# Patient Record
Sex: FEMALE | Race: WHITE | NOT HISPANIC OR LATINO | Employment: OTHER | URBAN - NONMETROPOLITAN AREA
[De-identification: names, ages, dates, MRNs, and addresses within clinical notes are randomized per-mention and may not be internally consistent; named-entity substitution may affect disease eponyms.]

---

## 2019-10-22 ENCOUNTER — OFFICE VISIT (OUTPATIENT)
Dept: FAMILY MEDICINE CLINIC | Facility: CLINIC | Age: 64
End: 2019-10-22

## 2019-10-22 ENCOUNTER — LAB (OUTPATIENT)
Dept: LAB | Facility: OTHER | Age: 64
End: 2019-10-22

## 2019-10-22 VITALS
HEART RATE: 71 BPM | BODY MASS INDEX: 26.63 KG/M2 | HEIGHT: 64 IN | WEIGHT: 156 LBS | SYSTOLIC BLOOD PRESSURE: 152 MMHG | DIASTOLIC BLOOD PRESSURE: 90 MMHG | OXYGEN SATURATION: 99 % | TEMPERATURE: 97.8 F

## 2019-10-22 DIAGNOSIS — Z78.0 POSTMENOPAUSAL: ICD-10-CM

## 2019-10-22 DIAGNOSIS — E66.3 OVERWEIGHT: ICD-10-CM

## 2019-10-22 DIAGNOSIS — Z23 NEED FOR PNEUMOCOCCAL VACCINATION: ICD-10-CM

## 2019-10-22 DIAGNOSIS — Z13.29 SCREENING FOR THYROID DISORDER: ICD-10-CM

## 2019-10-22 DIAGNOSIS — Z23 NEED FOR INFLUENZA VACCINATION: ICD-10-CM

## 2019-10-22 DIAGNOSIS — Z12.31 SCREENING MAMMOGRAM, ENCOUNTER FOR: ICD-10-CM

## 2019-10-22 DIAGNOSIS — I10 ESSENTIAL HYPERTENSION: ICD-10-CM

## 2019-10-22 DIAGNOSIS — I10 ESSENTIAL HYPERTENSION: Primary | ICD-10-CM

## 2019-10-22 DIAGNOSIS — Z13.820 SCREENING FOR OSTEOPOROSIS: ICD-10-CM

## 2019-10-22 DIAGNOSIS — M17.11 OSTEOARTHRITIS OF RIGHT KNEE, UNSPECIFIED OSTEOARTHRITIS TYPE: ICD-10-CM

## 2019-10-22 DIAGNOSIS — Z01.419 WELL WOMAN EXAM WITH ROUTINE GYNECOLOGICAL EXAM: ICD-10-CM

## 2019-10-22 LAB
ALBUMIN SERPL-MCNC: 4.8 G/DL (ref 3.5–5)
ALBUMIN/GLOB SERPL: 1.5 G/DL (ref 1.1–1.8)
ALP SERPL-CCNC: 65 U/L (ref 38–126)
ALT SERPL W P-5'-P-CCNC: 28 U/L
ANION GAP SERPL CALCULATED.3IONS-SCNC: 12 MMOL/L (ref 5–15)
AST SERPL-CCNC: 42 U/L (ref 14–36)
BASOPHILS # BLD AUTO: 0.02 10*3/MM3 (ref 0–0.2)
BASOPHILS NFR BLD AUTO: 0.3 % (ref 0–1.5)
BILIRUB SERPL-MCNC: 0.8 MG/DL (ref 0.2–1.3)
BUN BLD-MCNC: 17 MG/DL (ref 7–23)
BUN/CREAT SERPL: 25 (ref 7–25)
CALCIUM SPEC-SCNC: 10.2 MG/DL (ref 8.4–10.2)
CHLORIDE SERPL-SCNC: 105 MMOL/L (ref 101–112)
CHOLEST SERPL-MCNC: 235 MG/DL (ref 150–200)
CO2 SERPL-SCNC: 27 MMOL/L (ref 22–30)
CREAT BLD-MCNC: 0.68 MG/DL (ref 0.52–1.04)
DEPRECATED RDW RBC AUTO: 47.5 FL (ref 37–54)
EOSINOPHIL # BLD AUTO: 0.02 10*3/MM3 (ref 0–0.4)
EOSINOPHIL NFR BLD AUTO: 0.3 % (ref 0.3–6.2)
ERYTHROCYTE [DISTWIDTH] IN BLOOD BY AUTOMATED COUNT: 12.8 % (ref 12.3–15.4)
GFR SERPL CREATININE-BSD FRML MDRD: 87 ML/MIN/1.73 (ref 45–104)
GLOBULIN UR ELPH-MCNC: 3.2 GM/DL (ref 2.3–3.5)
GLUCOSE BLD-MCNC: 94 MG/DL (ref 70–99)
HCT VFR BLD AUTO: 38.5 % (ref 34–46.6)
HDLC SERPL-MCNC: 58 MG/DL (ref 40–59)
HGB BLD-MCNC: 12.6 G/DL (ref 12–15.9)
LDLC SERPL CALC-MCNC: 141 MG/DL
LDLC/HDLC SERPL: 2.43 {RATIO} (ref 0–3.22)
LYMPHOCYTES # BLD AUTO: 1.47 10*3/MM3 (ref 0.7–3.1)
LYMPHOCYTES NFR BLD AUTO: 25.1 % (ref 19.6–45.3)
MCH RBC QN AUTO: 33.1 PG (ref 26.6–33)
MCHC RBC AUTO-ENTMCNC: 32.7 G/DL (ref 31.5–35.7)
MCV RBC AUTO: 101 FL (ref 79–97)
MONOCYTES # BLD AUTO: 0.41 10*3/MM3 (ref 0.1–0.9)
MONOCYTES NFR BLD AUTO: 7 % (ref 5–12)
NEUTROPHILS # BLD AUTO: 3.93 10*3/MM3 (ref 1.7–7)
NEUTROPHILS NFR BLD AUTO: 67.3 % (ref 42.7–76)
PLATELET # BLD AUTO: 243 10*3/MM3 (ref 140–450)
PMV BLD AUTO: 9.3 FL (ref 6–12)
POTASSIUM BLD-SCNC: 3.7 MMOL/L (ref 3.4–5)
PROT SERPL-MCNC: 8 G/DL (ref 6.3–8.6)
RBC # BLD AUTO: 3.81 10*6/MM3 (ref 3.77–5.28)
SODIUM BLD-SCNC: 144 MMOL/L (ref 137–145)
TRIGL SERPL-MCNC: 179 MG/DL
VLDLC SERPL-MCNC: 35.8 MG/DL
WBC NRBC COR # BLD: 5.85 10*3/MM3 (ref 3.4–10.8)

## 2019-10-22 PROCEDURE — 99204 OFFICE O/P NEW MOD 45 MIN: CPT | Performed by: PHYSICIAN ASSISTANT

## 2019-10-22 PROCEDURE — 85025 COMPLETE CBC W/AUTO DIFF WBC: CPT | Performed by: PHYSICIAN ASSISTANT

## 2019-10-22 PROCEDURE — 84439 ASSAY OF FREE THYROXINE: CPT | Performed by: PHYSICIAN ASSISTANT

## 2019-10-22 PROCEDURE — 90653 IIV ADJUVANT VACCINE IM: CPT | Performed by: PHYSICIAN ASSISTANT

## 2019-10-22 PROCEDURE — 80061 LIPID PANEL: CPT | Performed by: PHYSICIAN ASSISTANT

## 2019-10-22 PROCEDURE — G0009 ADMIN PNEUMOCOCCAL VACCINE: HCPCS | Performed by: PHYSICIAN ASSISTANT

## 2019-10-22 PROCEDURE — 80053 COMPREHEN METABOLIC PANEL: CPT | Performed by: PHYSICIAN ASSISTANT

## 2019-10-22 PROCEDURE — 90670 PCV13 VACCINE IM: CPT | Performed by: PHYSICIAN ASSISTANT

## 2019-10-22 PROCEDURE — 36415 COLL VENOUS BLD VENIPUNCTURE: CPT | Performed by: PHYSICIAN ASSISTANT

## 2019-10-22 PROCEDURE — G0008 ADMIN INFLUENZA VIRUS VAC: HCPCS | Performed by: PHYSICIAN ASSISTANT

## 2019-10-22 PROCEDURE — 84443 ASSAY THYROID STIM HORMONE: CPT | Performed by: PHYSICIAN ASSISTANT

## 2019-10-22 RX ORDER — SENNOSIDES 8.6 MG
650 CAPSULE ORAL
COMMUNITY

## 2019-10-22 RX ORDER — MELOXICAM 7.5 MG/1
TABLET ORAL
Refills: 6 | COMMUNITY
Start: 2019-10-12 | End: 2021-05-04 | Stop reason: SDUPTHER

## 2019-10-22 RX ORDER — LISINOPRIL AND HYDROCHLOROTHIAZIDE 12.5; 1 MG/1; MG/1
1 TABLET ORAL DAILY
Qty: 30 TABLET | Refills: 5 | Status: SHIPPED | OUTPATIENT
Start: 2019-10-22 | End: 2020-04-16 | Stop reason: SDUPTHER

## 2019-10-23 LAB
T4 FREE SERPL-MCNC: 1.2 NG/DL (ref 0.93–1.7)
TSH SERPL DL<=0.05 MIU/L-ACNC: 2.05 UIU/ML (ref 0.27–4.2)

## 2019-10-25 DIAGNOSIS — E78.2 MIXED HYPERLIPIDEMIA: Primary | ICD-10-CM

## 2019-10-25 DIAGNOSIS — R71.8 ELEVATED MCV: ICD-10-CM

## 2019-10-25 RX ORDER — ATORVASTATIN CALCIUM 20 MG/1
20 TABLET, FILM COATED ORAL DAILY
Qty: 30 TABLET | Refills: 5 | Status: SHIPPED | OUTPATIENT
Start: 2019-10-25 | End: 2020-03-30

## 2019-10-28 ENCOUNTER — TELEPHONE (OUTPATIENT)
Dept: FAMILY MEDICINE CLINIC | Facility: CLINIC | Age: 64
End: 2019-10-28

## 2019-11-08 ENCOUNTER — LAB (OUTPATIENT)
Dept: LAB | Facility: OTHER | Age: 64
End: 2019-11-08

## 2019-11-08 DIAGNOSIS — R71.8 ELEVATED MCV: ICD-10-CM

## 2019-11-08 LAB
BASOPHILS # BLD AUTO: 0.02 10*3/MM3 (ref 0–0.2)
BASOPHILS NFR BLD AUTO: 0.3 % (ref 0–1.5)
DEPRECATED RDW RBC AUTO: 43.2 FL (ref 37–54)
EOSINOPHIL # BLD AUTO: 0.04 10*3/MM3 (ref 0–0.4)
EOSINOPHIL NFR BLD AUTO: 0.7 % (ref 0.3–6.2)
ERYTHROCYTE [DISTWIDTH] IN BLOOD BY AUTOMATED COUNT: 12 % (ref 12.3–15.4)
HCT VFR BLD AUTO: 38.9 % (ref 34–46.6)
HGB BLD-MCNC: 12.3 G/DL (ref 12–15.9)
LYMPHOCYTES # BLD AUTO: 1.45 10*3/MM3 (ref 0.7–3.1)
LYMPHOCYTES NFR BLD AUTO: 23.8 % (ref 19.6–45.3)
MCH RBC QN AUTO: 31.7 PG (ref 26.6–33)
MCHC RBC AUTO-ENTMCNC: 31.6 G/DL (ref 31.5–35.7)
MCV RBC AUTO: 100.3 FL (ref 79–97)
MONOCYTES # BLD AUTO: 0.43 10*3/MM3 (ref 0.1–0.9)
MONOCYTES NFR BLD AUTO: 7 % (ref 5–12)
NEUTROPHILS # BLD AUTO: 4.16 10*3/MM3 (ref 1.7–7)
NEUTROPHILS NFR BLD AUTO: 68.2 % (ref 42.7–76)
PLATELET # BLD AUTO: 243 10*3/MM3 (ref 140–450)
PMV BLD AUTO: 9.4 FL (ref 6–12)
RBC # BLD AUTO: 3.88 10*6/MM3 (ref 3.77–5.28)
WBC NRBC COR # BLD: 6.1 10*3/MM3 (ref 3.4–10.8)

## 2019-11-08 PROCEDURE — 36415 COLL VENOUS BLD VENIPUNCTURE: CPT | Performed by: PHYSICIAN ASSISTANT

## 2019-11-08 PROCEDURE — 82746 ASSAY OF FOLIC ACID SERUM: CPT | Performed by: PHYSICIAN ASSISTANT

## 2019-11-08 PROCEDURE — 82607 VITAMIN B-12: CPT | Performed by: PHYSICIAN ASSISTANT

## 2019-11-08 PROCEDURE — 85025 COMPLETE CBC W/AUTO DIFF WBC: CPT | Performed by: PHYSICIAN ASSISTANT

## 2019-11-09 LAB
FOLATE SERPL-MCNC: >20 NG/ML (ref 4.78–24.2)
VIT B12 BLD-MCNC: >2000 PG/ML (ref 211–946)

## 2019-11-10 PROBLEM — E66.3 OVERWEIGHT: Status: ACTIVE | Noted: 2019-11-10

## 2019-11-10 PROBLEM — Z78.0 POSTMENOPAUSAL: Status: ACTIVE | Noted: 2019-11-10

## 2019-11-10 PROBLEM — I10 ESSENTIAL HYPERTENSION: Status: ACTIVE | Noted: 2019-11-10

## 2019-11-11 DIAGNOSIS — R71.8 ELEVATED MCV: Primary | ICD-10-CM

## 2019-11-12 ENCOUNTER — TELEPHONE (OUTPATIENT)
Dept: FAMILY MEDICINE CLINIC | Facility: CLINIC | Age: 64
End: 2019-11-12

## 2019-11-12 NOTE — TELEPHONE ENCOUNTER
Informed pt of results and to stop taking b12 supplement, repeat labs in one month. She stated her understanding.

## 2020-03-30 DIAGNOSIS — E78.2 MIXED HYPERLIPIDEMIA: ICD-10-CM

## 2020-03-30 RX ORDER — ATORVASTATIN CALCIUM 20 MG/1
20 TABLET, FILM COATED ORAL DAILY
Qty: 30 TABLET | Refills: 0 | Status: SHIPPED | OUTPATIENT
Start: 2020-03-30 | End: 2020-04-16 | Stop reason: SDUPTHER

## 2020-04-16 DIAGNOSIS — E78.2 MIXED HYPERLIPIDEMIA: ICD-10-CM

## 2020-04-16 DIAGNOSIS — I10 ESSENTIAL HYPERTENSION: ICD-10-CM

## 2020-04-16 RX ORDER — ATORVASTATIN CALCIUM 20 MG/1
20 TABLET, FILM COATED ORAL DAILY
Qty: 30 TABLET | Refills: 0 | Status: SHIPPED | OUTPATIENT
Start: 2020-04-16 | End: 2020-04-17 | Stop reason: SDUPTHER

## 2020-04-16 RX ORDER — LISINOPRIL AND HYDROCHLOROTHIAZIDE 12.5; 1 MG/1; MG/1
1 TABLET ORAL DAILY
Qty: 30 TABLET | Refills: 0 | Status: SHIPPED | OUTPATIENT
Start: 2020-04-16 | End: 2020-04-17 | Stop reason: SDUPTHER

## 2020-04-17 ENCOUNTER — OFFICE VISIT (OUTPATIENT)
Dept: FAMILY MEDICINE CLINIC | Facility: CLINIC | Age: 65
End: 2020-04-17

## 2020-04-17 DIAGNOSIS — I10 ESSENTIAL HYPERTENSION: Primary | ICD-10-CM

## 2020-04-17 DIAGNOSIS — E78.2 MIXED HYPERLIPIDEMIA: ICD-10-CM

## 2020-04-17 DIAGNOSIS — M17.11 OSTEOARTHRITIS OF RIGHT KNEE, UNSPECIFIED OSTEOARTHRITIS TYPE: ICD-10-CM

## 2020-04-17 DIAGNOSIS — Z13.29 SCREENING FOR THYROID DISORDER: ICD-10-CM

## 2020-04-17 PROCEDURE — 99213 OFFICE O/P EST LOW 20 MIN: CPT | Performed by: PHYSICIAN ASSISTANT

## 2020-04-17 RX ORDER — ATORVASTATIN CALCIUM 20 MG/1
20 TABLET, FILM COATED ORAL DAILY
Qty: 30 TABLET | Refills: 5 | Status: SHIPPED | OUTPATIENT
Start: 2020-04-17 | End: 2020-07-20 | Stop reason: SDUPTHER

## 2020-04-17 RX ORDER — LISINOPRIL AND HYDROCHLOROTHIAZIDE 12.5; 1 MG/1; MG/1
1 TABLET ORAL DAILY
Qty: 30 TABLET | Refills: 5 | Status: SHIPPED | OUTPATIENT
Start: 2020-04-17 | End: 2020-07-20 | Stop reason: SDUPTHER

## 2020-04-17 NOTE — PROGRESS NOTES
Subjective   Donna Arcos is a 64 y.o. female.     This visit has been rescheduled as a phone visit to comply with patient safety concerns in accordance with CDC recommendations. Total time of discussion was 20 minutes.   You have chosen to receive care through a telephone visit. Do you consent to use a telephone visit for your medical care today? Yes     History of Present Illness   Pt presents today via telephone encounter for a f/u on chronic conditions.     Essential hypertension - chronic, managed with prinzide. Pt denies chest pain, dyspnea, facial flushing, lightheadedness, dizziness, syncope/presyncope. Reports she does not routinely check blood pressure at home - but checks blood pressure when she is at [facility]. Reports she checked her blood pressure x last week at [facility] with blood pressure of 120s/70s. Documented visit in [facility] with orthopedics on 4/8/20 with in office blood pressure running 170s/90s - pt reports she is often anxious to have her blood pressure checked in office and reports this is likely why blood pressure was elevated at office visit. Recommended at home blood pressure cuff to check blood pressure bid - to call office in 1 week with readings to discuss need for further adjustment of pharmacotherapy.    Right knee OA - followed by Dr. Crabtree, managed with mobic, corticosteroid knee injections q 3-6 months by orthopedics.    Hyperlipidemia - chronic, managed with atorvastatin.     Denies additional complaints/concerns. Denies chest pain, dyspnea, abdominal pain, abnormal weight loss/gain.     Due baseline labs.     Mammogram - due. Ordered at previous visit - reports she was unable to schedule this d/t schedule conflict. Denies family hx of breast cancer. Denies palpable lumps, masses, skin color changes to bilateral breast.    Dexa - due. Ordered at previous visit - reports she was unable to schedule this d/t schedule conflict.     The following portions of the patient's history were  reviewed and updated as appropriate: allergies, current medications, past family history, past medical history, past social history, past surgical history and problem list.    Review of Systems   Constitutional: Negative for activity change, appetite change, chills, diaphoresis, fatigue, fever, unexpected weight gain and unexpected weight loss.   HENT: Negative.  Negative for congestion, dental problem, drooling, ear discharge, ear pain, facial swelling, hearing loss, mouth sores, nosebleeds, postnasal drip, rhinorrhea, sinus pressure, sneezing, sore throat, swollen glands, tinnitus, trouble swallowing and voice change.    Eyes: Negative for blurred vision, double vision, pain and visual disturbance.   Respiratory: Negative for apnea, cough, choking, chest tightness, shortness of breath, wheezing and stridor.    Cardiovascular: Negative for chest pain, palpitations and leg swelling.   Gastrointestinal: Negative for abdominal distention, abdominal pain, constipation, diarrhea, nausea, vomiting, GERD and indigestion.   Endocrine: Negative.    Musculoskeletal: Positive for arthralgias. Negative for back pain, gait problem, joint swelling, myalgias, neck pain, neck stiffness and bursitis.   Skin: Negative.    Neurological: Negative for dizziness, weakness, light-headedness, headache and confusion.   Psychiatric/Behavioral: Negative.  Negative for agitation, decreased concentration, sleep disturbance, suicidal ideas, depressed mood and stress. The patient is not nervous/anxious.         Objective   Physical Exam   Constitutional: She is oriented to person, place, and time.   Neurological: She is alert and oriented to person, place, and time.   Psychiatric: She has a normal mood and affect. Her behavior is normal. Judgment and thought content normal.     Physical exam and v/s unable to be obtained d/t telephone encounter.    PHQ-9 Depression Screening  Little interest or pleasure in doing things? 0   Feeling down,  depressed, or hopeless? 0   Trouble falling or staying asleep, or sleeping too much? 0   Feeling tired or having little energy? 0   Poor appetite or overeating? 0   Feeling bad about yourself - or that you are a failure or have let yourself or your family down? 0   Trouble concentrating on things, such as reading the newspaper or watching television? 0   Moving or speaking so slowly that other people could have noticed? Or the opposite - being so fidgety or restless that you have been moving around a lot more than usual? 0   Thoughts that you would be better off dead, or of hurting yourself in some way? 0   PHQ-9 Total Score 0   If you checked off any problems, how difficult have these problems made it for you to do your work, take care of things at home, or get along with other people?       Assessment/Plan   Diagnoses and all orders for this visit:    Essential hypertension  -     lisinopril-hydrochlorothiazide (PRINZIDE,ZESTORETIC) 10-12.5 MG per tablet; Take 1 tablet by mouth Daily.  -     CBC & Differential; Future  -     Comprehensive metabolic panel; Future  -     Lipid panel; Future    Mixed hyperlipidemia  -     atorvastatin (LIPITOR) 20 MG tablet; Take 1 tablet by mouth Daily.  -     CBC & Differential; Future  -     Comprehensive metabolic panel; Future  -     Lipid panel; Future    Screening for thyroid disorder  -     TSH; Future  -     T4, free; Future    Osteoarthritis of right knee, unspecified osteoarthritis type      Essential htn - chronic, managed with prinzide. Reports she checked her blood pressure x last week at [facility] with blood pressure of 120s/70s. [facility]ed visit in [facility] with [facility]ics on 4/8/20 with in office blood pressure running 170s/90s - pt reports she is often anxious to have her blood pressure checked in office and reports this is likely why blood pressure was elevated at office visit - possible white coat syndrome with hypertension. Recommended at home blood pressure cuff to  check blood pressure bid - to call office in 1 week with readings to discuss need for further adjustment of pharmacotherapy. We discussed dietary changes and the need to decrease salt intake, consistent with the dash diet. Also encouraged the patient to begin regular exercise routine.     Right knee OA - followed by Dr. Crabtree, managed with mobic, corticosteroid knee injections q 3-6 months by orthopedics.     Mammogram - due. Encouraged pt to schedule mammogram, previously ordered at prior visit.    Hyperlipidemia - chronic, managed with atorvastatin      Dexa - due. Ordered at previous visit - reports she was unable to schedule this d/t schedule conflict. Encouraged pt to schedule dexa as previously ordered.    Patient educated to follow up in 3 months or sooner than next scheduled appointment if symptoms worsen or do not improve. Patient stated understanding and has agreed with plan of care. After visit summary was printed and given to patient.      This document has been electronically signed by Alissa English PA-C on April 17, 2020 11:43,.

## 2020-07-07 ENCOUNTER — LAB (OUTPATIENT)
Dept: LAB | Facility: OTHER | Age: 65
End: 2020-07-07

## 2020-07-07 DIAGNOSIS — Z13.29 SCREENING FOR THYROID DISORDER: ICD-10-CM

## 2020-07-07 DIAGNOSIS — I10 ESSENTIAL HYPERTENSION: ICD-10-CM

## 2020-07-07 DIAGNOSIS — E78.2 MIXED HYPERLIPIDEMIA: ICD-10-CM

## 2020-07-07 LAB
ALBUMIN SERPL-MCNC: 4.7 G/DL (ref 3.5–5)
ALBUMIN/GLOB SERPL: 1.6 G/DL (ref 1.1–1.8)
ALP SERPL-CCNC: 61 U/L (ref 38–126)
ALT SERPL W P-5'-P-CCNC: 15 U/L
ANION GAP SERPL CALCULATED.3IONS-SCNC: 11 MMOL/L (ref 5–15)
AST SERPL-CCNC: 27 U/L (ref 14–36)
BASOPHILS # BLD AUTO: 0.01 10*3/MM3 (ref 0–0.2)
BASOPHILS NFR BLD AUTO: 0.2 % (ref 0–1.5)
BILIRUB SERPL-MCNC: 0.6 MG/DL (ref 0.2–1.3)
BUN SERPL-MCNC: 24 MG/DL (ref 7–23)
BUN/CREAT SERPL: 29.3 (ref 7–25)
CALCIUM SPEC-SCNC: 10.3 MG/DL (ref 8.4–10.2)
CHLORIDE SERPL-SCNC: 102 MMOL/L (ref 101–112)
CHOLEST SERPL-MCNC: 157 MG/DL (ref 150–200)
CO2 SERPL-SCNC: 27 MMOL/L (ref 22–30)
CREAT SERPL-MCNC: 0.82 MG/DL (ref 0.52–1.04)
DEPRECATED RDW RBC AUTO: 45.1 FL (ref 37–54)
EOSINOPHIL # BLD AUTO: 0.05 10*3/MM3 (ref 0–0.4)
EOSINOPHIL NFR BLD AUTO: 1 % (ref 0.3–6.2)
ERYTHROCYTE [DISTWIDTH] IN BLOOD BY AUTOMATED COUNT: 12.5 % (ref 12.3–15.4)
GFR SERPL CREATININE-BSD FRML MDRD: 70 ML/MIN/1.73 (ref 45–104)
GLOBULIN UR ELPH-MCNC: 2.9 GM/DL (ref 2.3–3.5)
GLUCOSE SERPL-MCNC: 109 MG/DL (ref 70–99)
HCT VFR BLD AUTO: 36.6 % (ref 34–46.6)
HDLC SERPL-MCNC: 54 MG/DL (ref 40–59)
HGB BLD-MCNC: 11.5 G/DL (ref 12–15.9)
LDLC SERPL CALC-MCNC: 73 MG/DL
LDLC/HDLC SERPL: 1.36 {RATIO} (ref 0–3.22)
LYMPHOCYTES # BLD AUTO: 1.2 10*3/MM3 (ref 0.7–3.1)
LYMPHOCYTES NFR BLD AUTO: 22.9 % (ref 19.6–45.3)
MCH RBC QN AUTO: 31.9 PG (ref 26.6–33)
MCHC RBC AUTO-ENTMCNC: 31.4 G/DL (ref 31.5–35.7)
MCV RBC AUTO: 101.4 FL (ref 79–97)
MONOCYTES # BLD AUTO: 0.46 10*3/MM3 (ref 0.1–0.9)
MONOCYTES NFR BLD AUTO: 8.8 % (ref 5–12)
NEUTROPHILS NFR BLD AUTO: 3.53 10*3/MM3 (ref 1.7–7)
NEUTROPHILS NFR BLD AUTO: 67.1 % (ref 42.7–76)
PLATELET # BLD AUTO: 244 10*3/MM3 (ref 140–450)
PMV BLD AUTO: 9.3 FL (ref 6–12)
POTASSIUM SERPL-SCNC: 4.2 MMOL/L (ref 3.4–5)
PROT SERPL-MCNC: 7.6 G/DL (ref 6.3–8.6)
RBC # BLD AUTO: 3.61 10*6/MM3 (ref 3.77–5.28)
SODIUM SERPL-SCNC: 140 MMOL/L (ref 137–145)
TRIGL SERPL-MCNC: 148 MG/DL
VLDLC SERPL-MCNC: 29.6 MG/DL
WBC # BLD AUTO: 5.25 10*3/MM3 (ref 3.4–10.8)

## 2020-07-07 PROCEDURE — 36415 COLL VENOUS BLD VENIPUNCTURE: CPT | Performed by: PHYSICIAN ASSISTANT

## 2020-07-07 PROCEDURE — 80061 LIPID PANEL: CPT | Performed by: PHYSICIAN ASSISTANT

## 2020-07-07 PROCEDURE — 85025 COMPLETE CBC W/AUTO DIFF WBC: CPT | Performed by: PHYSICIAN ASSISTANT

## 2020-07-07 PROCEDURE — 80053 COMPREHEN METABOLIC PANEL: CPT | Performed by: PHYSICIAN ASSISTANT

## 2020-07-07 PROCEDURE — 84439 ASSAY OF FREE THYROXINE: CPT | Performed by: PHYSICIAN ASSISTANT

## 2020-07-07 PROCEDURE — 84443 ASSAY THYROID STIM HORMONE: CPT | Performed by: PHYSICIAN ASSISTANT

## 2020-07-08 DIAGNOSIS — D53.9 MACROCYTIC ANEMIA: Primary | ICD-10-CM

## 2020-07-08 LAB
T4 FREE SERPL-MCNC: 1.16 NG/DL (ref 0.93–1.7)
TSH SERPL DL<=0.05 MIU/L-ACNC: 2.14 UIU/ML (ref 0.27–4.2)

## 2020-07-08 RX ORDER — LANOLIN ALCOHOL/MO/W.PET/CERES
400 CREAM (GRAM) TOPICAL DAILY
Qty: 30 TABLET | Refills: 5 | Status: SHIPPED | OUTPATIENT
Start: 2020-07-08 | End: 2020-07-20 | Stop reason: SDUPTHER

## 2020-07-08 RX ORDER — CYANOCOBALAMIN 1000 UG/ML
1000 INJECTION, SOLUTION INTRAMUSCULAR; SUBCUTANEOUS
Status: DISCONTINUED | OUTPATIENT
Start: 2020-07-08 | End: 2021-05-04

## 2020-07-20 ENCOUNTER — OFFICE VISIT (OUTPATIENT)
Dept: FAMILY MEDICINE CLINIC | Facility: CLINIC | Age: 65
End: 2020-07-20

## 2020-07-20 DIAGNOSIS — Z12.31 SCREENING MAMMOGRAM, ENCOUNTER FOR: ICD-10-CM

## 2020-07-20 DIAGNOSIS — I10 ESSENTIAL HYPERTENSION: Primary | ICD-10-CM

## 2020-07-20 DIAGNOSIS — M17.11 OSTEOARTHRITIS OF RIGHT KNEE, UNSPECIFIED OSTEOARTHRITIS TYPE: ICD-10-CM

## 2020-07-20 DIAGNOSIS — Z78.0 POSTMENOPAUSAL: ICD-10-CM

## 2020-07-20 DIAGNOSIS — E78.2 MIXED HYPERLIPIDEMIA: ICD-10-CM

## 2020-07-20 DIAGNOSIS — D53.9 MACROCYTIC ANEMIA: ICD-10-CM

## 2020-07-20 PROCEDURE — 99442 PR PHYS/QHP TELEPHONE EVALUATION 11-20 MIN: CPT | Performed by: PHYSICIAN ASSISTANT

## 2020-07-20 RX ORDER — LANOLIN ALCOHOL/MO/W.PET/CERES
400 CREAM (GRAM) TOPICAL DAILY
Qty: 90 TABLET | Refills: 1 | Status: SHIPPED | OUTPATIENT
Start: 2020-07-20 | End: 2020-10-27

## 2020-07-20 RX ORDER — LISINOPRIL AND HYDROCHLOROTHIAZIDE 12.5; 1 MG/1; MG/1
1 TABLET ORAL DAILY
Qty: 90 TABLET | Refills: 1 | Status: SHIPPED | OUTPATIENT
Start: 2020-07-20 | End: 2020-10-19

## 2020-07-20 RX ORDER — ATORVASTATIN CALCIUM 20 MG/1
20 TABLET, FILM COATED ORAL DAILY
Qty: 90 TABLET | Refills: 1 | Status: SHIPPED | OUTPATIENT
Start: 2020-07-20 | End: 2020-10-27

## 2020-07-20 NOTE — PROGRESS NOTES
Subjective   Donna Arcos is a 64 y.o. female.     This visit has been rescheduled as a phone visit to comply with patient safety concerns in accordance with CDC recommendations. Total time of discussion was 15 minutes.   You have chosen to receive care through a telephone visit. Do you consent to use a telephone visit for your medical care today? Yes     History of Present Illness   Pt presents today via telephone encounter for a f/u on chronic conditions.     Essential hypertension - chronic, managed with prinzide. Pt denies chest pain, dyspnea, facial flushing, lightheadedness, dizziness, syncope/presyncope. Reports she does not routinely check blood pressure at home - but thinks it runs 120s/70s. Blood pressure checked at visit today with home monitor - 120/72, pulse 79.     Right knee OA - followed by Dr. Crabtree, managed with mobic, corticosteroid knee injections q 3-6 months by orthopedics.    Macrocytic anemia on July 2020 labs. Started on folate and vit b12 replacement.     Hyperlipidemia - chronic, managed with atorvastatin.     Denies additional complaints/concerns. Denies chest pain, dyspnea, abdominal pain, abnormal weight loss/gain.     Mammogram - due. Ordered at previous visit - reports she was unable to schedule this d/t schedule conflict. Denies family hx of breast cancer. Denies palpable lumps, masses, skin color changes to bilateral breast.    Dexa - due. Ordered at previous visit - reports she was unable to schedule this d/t schedule conflict.       The following portions of the patient's history were reviewed and updated as appropriate: allergies, current medications, past family history, past medical history, past social history, past surgical history and problem list.    Review of Systems   Constitutional: Negative for activity change, appetite change, chills, diaphoresis, fatigue, fever, unexpected weight gain and unexpected weight loss.   HENT: Negative.  Negative for congestion, dental  problem, drooling, ear discharge, ear pain, facial swelling, hearing loss, mouth sores, nosebleeds, postnasal drip, rhinorrhea, sinus pressure, sneezing, sore throat, swollen glands, tinnitus, trouble swallowing and voice change.    Eyes: Negative for blurred vision, double vision, pain and visual disturbance.   Respiratory: Negative for apnea, cough, choking, chest tightness, shortness of breath, wheezing and stridor.    Cardiovascular: Negative for chest pain, palpitations and leg swelling.   Gastrointestinal: Negative for abdominal distention, abdominal pain, constipation, diarrhea, nausea, vomiting, GERD and indigestion.   Endocrine: Negative.    Musculoskeletal: Positive for arthralgias. Negative for back pain, gait problem, joint swelling, myalgias, neck pain, neck stiffness and bursitis.   Skin: Negative.    Neurological: Negative for dizziness, weakness, light-headedness, headache and confusion.   Psychiatric/Behavioral: Negative.  Negative for agitation, decreased concentration, sleep disturbance, suicidal ideas, depressed mood and stress. The patient is not nervous/anxious.         Objective   Physical Exam   Constitutional: No distress.   HENT:   Head: Normocephalic and atraumatic.   Right Ear: External ear normal.   Left Ear: External ear normal.   Nose: Nose normal.   Eyes: Conjunctivae and EOM are normal.   Pulmonary/Chest: Effort normal. No stridor.  No respiratory distress. She no audible wheeze...She exhibits no tenderness.   Abdominal: Abdomen appears normal. Soft.   Musculoskeletal: Normal range of motion.         General: No edema.   Neurological: She is alert.   Skin: Skin is warm and dry. No rash noted. She is not diaphoretic. No erythema. No pallor.   Psychiatric: She has a normal mood and affect.      Physical exam and v/s unable to be obtained d/t telephone encounter.      Assessment/Plan   Diagnoses and all orders for this visit:    Essential hypertension  -      lisinopril-hydrochlorothiazide (PRINZIDE,ZESTORETIC) 10-12.5 MG per tablet; Take 1 tablet by mouth Daily.    Mixed hyperlipidemia  -     atorvastatin (LIPITOR) 20 MG tablet; Take 1 tablet by mouth Daily.    Macrocytic anemia  -     folic acid (V-R FOLIC ACID) 400 MCG tablet; Take 1 tablet by mouth Daily.    Osteoarthritis of right knee, unspecified osteoarthritis type    Screening mammogram, encounter for    Postmenopausal      Essential htn - chronic, controlled at home blood pressure readings, managed with prinzide. - possible white coat syndrome with hypertension. We discussed dietary changes and the need to decrease salt intake, consistent with the dash diet. Also encouraged the patient to begin regular exercise routine.     Right knee OA - followed by Dr. Crabtree, managed with mobic, corticosteroid knee injections q 3-6 months by orthopedics.     Mammogram - due. Encouraged pt to schedule mammogram, previously ordered at prior visit.    Hyperlipidemia - chronic, managed with atorvastatin      Dexa - due. Ordered at previous visit - reports she was unable to schedule this d/t schedule conflict. Encouraged pt to schedule dexa as previously ordered.    Patient educated to follow up in 6 months or sooner than next scheduled appointment if needed. Patient stated understanding and has agreed with plan of care. After visit summary was printed and given to patient.      This document has been electronically signed by Alissa English PA-C on July 20, 2020 15:39,.

## 2020-07-28 ENCOUNTER — OFFICE VISIT (OUTPATIENT)
Dept: FAMILY MEDICINE CLINIC | Facility: CLINIC | Age: 65
End: 2020-07-28

## 2020-07-28 DIAGNOSIS — Z00.00 MEDICARE ANNUAL WELLNESS VISIT, SUBSEQUENT: Primary | ICD-10-CM

## 2020-07-28 PROCEDURE — G0439 PPPS, SUBSEQ VISIT: HCPCS | Performed by: PHYSICIAN ASSISTANT

## 2020-07-28 NOTE — PROGRESS NOTES
The ABCs of the Annual Wellness Visit  Subsequent Medicare Wellness Visit    No chief complaint on file.      Subjective   History of Present Illness:  Donna Arcos is a 64 y.o. female who presents for a Subsequent Medicare Wellness Visit.    HEALTH RISK ASSESSMENT    Recent Hospitalizations:  No hospitalization(s) within the last year.    Current Medical Providers:  Patient Care Team:  Alissa English PA-C as PCP - General (Family Medicine)    Smoking Status:  Social History     Tobacco Use   Smoking Status Never Smoker       Alcohol Consumption:  Social History     Substance and Sexual Activity   Alcohol Use No   • Frequency: Never       Depression Screen:   PHQ-2/PHQ-9 Depression Screening 7/28/2020   Little interest or pleasure in doing things 0   Feeling down, depressed, or hopeless 0   Trouble falling or staying asleep, or sleeping too much -   Feeling tired or having little energy -   Poor appetite or overeating -   Feeling bad about yourself - or that you are a failure or have let yourself or your family down -   Trouble concentrating on things, such as reading the newspaper or watching television -   Moving or speaking so slowly that other people could have noticed. Or the opposite - being so fidgety or restless that you have been moving around a lot more than usual -   Thoughts that you would be better off dead, or of hurting yourself in some way -   Total Score 0       Fall Risk Screen:  STEADI Fall Risk Assessment was completed, and patient is at LOW risk for falls.Assessment completed on:7/28/2020    Health Habits and Functional and Cognitive Screening:  Functional & Cognitive Status 7/28/2020   Do you have difficulty preparing food and eating? No   Do you have difficulty bathing yourself, getting dressed or grooming yourself? No   Do you have difficulty using the toilet? No   Do you have difficulty moving around from place to place? No   Do you have trouble with steps or getting out of a bed or a  chair? No   Current Diet Well Balanced Diet   Dental Exam Not up to date   Eye Exam Not up to date   Exercise (times per week) 4 times per week   Current Exercise Activities Include Gardening   Do you need help using the phone?  No   Are you deaf or do you have serious difficulty hearing?  No   Do you need help with transportation? No   Do you need help shopping? No   Do you need help preparing meals?  No   Do you need help with housework?  No   Do you need help with laundry? No   Do you need help taking your medications? No   Do you need help managing money? No   Do you ever drive or ride in a car without wearing a seat belt? No   Have you felt unusual stress, anger or loneliness in the last month? No   Who do you live with? Spouse   If you need help, do you have trouble finding someone available to you? No   Have you been bothered in the last four weeks by sexual problems? No   Do you have difficulty concentrating, remembering or making decisions? No         Does the patient have evidence of cognitive impairment? No    Asprin use counseling:Does not need ASA (and currently is not on it)    Age-appropriate Screening Schedule:  Refer to the list below for future screening recommendations based on patient's age, sex and/or medical conditions. Orders for these recommended tests are listed in the plan section. The patient has been provided with a written plan.    Health Maintenance   Topic Date Due   • MAMMOGRAM  1955   • TDAP/TD VACCINES (1 - Tdap) 10/06/1964   • ZOSTER VACCINE (1 of 2) 10/06/2003   • COLONOSCOPY  10/22/2019   • INFLUENZA VACCINE  08/01/2020   • LIPID PANEL  07/07/2021              Outpatient Medications Prior to Visit   Medication Sig Dispense Refill   • acetaminophen (TYLENOL) 650 MG 8 hr tablet Take 650 mg by mouth.     • atorvastatin (LIPITOR) 20 MG tablet Take 1 tablet by mouth Daily. 90 tablet 1   • folic acid (V-R FOLIC ACID) 400 MCG tablet Take 1 tablet by mouth Daily. 90 tablet 1   •  lisinopril-hydrochlorothiazide (PRINZIDE,ZESTORETIC) 10-12.5 MG per tablet Take 1 tablet by mouth Daily. 90 tablet 1   • meloxicam (MOBIC) 7.5 MG tablet TAKE 1 TABLET BY MOUTH DAILY  DAYS  6     Facility-Administered Medications Prior to Visit   Medication Dose Route Frequency Provider Last Rate Last Dose   • cyanocobalamin injection 1,000 mcg  1,000 mcg Intramuscular Q28 Days Alissa English PA-C           Patient Active Problem List   Diagnosis   • Essential hypertension   • Postmenopausal   • Overweight     Fall Risk Assessment was completed, and patient is at low risk for falls.   Advanced Care Planning:  ACP discussion was held with the patient during this visit. Patient does not have an advance directive, information provided.    Review of Systems   Constitutional: Negative for activity change, appetite change, chills, diaphoresis, fatigue, fever and unexpected weight change.   HENT: Negative.    Eyes: Negative.    Respiratory: Negative for apnea, cough, choking, chest tightness, shortness of breath, wheezing and stridor.    Cardiovascular: Negative for chest pain and palpitations.   Gastrointestinal: Negative for abdominal distention, abdominal pain, constipation, diarrhea, nausea and vomiting.   Endocrine: Negative for cold intolerance, heat intolerance, polydipsia, polyphagia and polyuria.   Genitourinary: Negative.    Musculoskeletal: Positive for arthralgias.   Skin: Negative.  Negative for rash and wound.   Neurological: Negative for dizziness, weakness, light-headedness and headaches.   Hematological: Negative.    Psychiatric/Behavioral: Negative.        Compared to one year ago, the patient feels her physical health is better.  Compared to one year ago, the patient feels her mental health is better.    Reviewed chart for potential of high risk medication in the elderly: yes  Reviewed chart for potential of harmful drug interactions in the elderly:yes    Objective      Discussed the patient's  BMI with her. The BMI is above average; BMI management plan is completed.    Physical Exam   Constitutional: She is oriented to person, place, and time. She appears well-developed and well-nourished. No distress.   HENT:   Right Ear: External ear normal.   Left Ear: External ear normal.   Eyes: Conjunctivae and EOM are normal.   Neck: Normal range of motion. Neck supple.   Pulmonary/Chest: Effort normal. No stridor. No respiratory distress. She has no wheezes. She exhibits no tenderness.   Abdominal: Soft. She exhibits no distension. There is no tenderness.   Musculoskeletal: Normal range of motion. She exhibits no edema.   Neurological: She is alert and oriented to person, place, and time.   Skin: Skin is warm and dry. No rash noted. She is not diaphoretic.   Psychiatric: She has a normal mood and affect. Her behavior is normal. Judgment and thought content normal.   Nursing note and vitals reviewed.  PE limited d/t telehealth encounter.    Lab Results   Component Value Date    TRIG 148 07/07/2020    HDL 54 07/07/2020    LDL 73 07/07/2020    VLDL 29.6 07/07/2020        Assessment/Plan   Medicare Risks and Personalized Health Plan  CMS Preventative Services Quick Reference  Advance Directive Discussion  Breast Cancer/Mammogram Screening  Cardiovascular risk  Colon Cancer Screening  Depression/Dysphoria  Diabetic Lab Screening   Fall Risk  Immunizations Discussed/Encouraged (specific immunizations; Td, Pneumococcal 23, Prevnar and Shingrix )  Obesity/Overweight     The above risks/problems have been discussed with the patient.  Pertinent information has been shared with the patient in the After Visit Summary.  Follow up plans and orders are seen below in the Assessment/Plan Section.    Diagnoses and all orders for this visit:    1. Medicare annual wellness visit, subsequent (Primary)      Medicare annual wellness visit performed via telehealth. F/u in 6 months as scheduled for next visit or sooner if needed. Patient  stated understanding and has agreed with plan of care. After visit summary was printed and given to patient.     Follow Up:  Return in about 6 months (around 1/28/2021).     An After Visit Summary and PPPS were given to the patient.            This document has been electronically signed by Alissa English PA-C on July 28, 2020 13:23,.

## 2020-10-16 DIAGNOSIS — I10 ESSENTIAL HYPERTENSION: ICD-10-CM

## 2020-10-19 RX ORDER — LISINOPRIL AND HYDROCHLOROTHIAZIDE 12.5; 1 MG/1; MG/1
1 TABLET ORAL DAILY
Qty: 30 TABLET | Refills: 5 | Status: SHIPPED | OUTPATIENT
Start: 2020-10-19 | End: 2021-05-04 | Stop reason: SDUPTHER

## 2020-10-27 DIAGNOSIS — E78.2 MIXED HYPERLIPIDEMIA: ICD-10-CM

## 2020-10-27 DIAGNOSIS — D53.9 MACROCYTIC ANEMIA: ICD-10-CM

## 2020-10-27 RX ORDER — LANOLIN ALCOHOL/MO/W.PET/CERES
400 CREAM (GRAM) TOPICAL DAILY
Qty: 90 TABLET | Refills: 1 | Status: SHIPPED | OUTPATIENT
Start: 2020-10-27 | End: 2021-05-04

## 2020-10-27 RX ORDER — ATORVASTATIN CALCIUM 20 MG/1
20 TABLET, FILM COATED ORAL DAILY
Qty: 90 TABLET | Refills: 1 | Status: SHIPPED | OUTPATIENT
Start: 2020-10-27 | End: 2021-05-04 | Stop reason: SDUPTHER

## 2021-04-26 DIAGNOSIS — Z13.21 ENCOUNTER FOR SCREENING FOR NUTRITIONAL DISORDER: ICD-10-CM

## 2021-04-26 DIAGNOSIS — I10 ESSENTIAL HYPERTENSION: ICD-10-CM

## 2021-04-26 DIAGNOSIS — Z13.1 SCREENING FOR DIABETES MELLITUS: ICD-10-CM

## 2021-04-26 DIAGNOSIS — E78.2 MIXED HYPERLIPIDEMIA: Primary | ICD-10-CM

## 2021-04-26 DIAGNOSIS — Z78.0 POSTMENOPAUSAL: ICD-10-CM

## 2021-04-26 DIAGNOSIS — R71.8 ELEVATED MCV: ICD-10-CM

## 2021-04-26 DIAGNOSIS — R73.02 IGT (IMPAIRED GLUCOSE TOLERANCE): ICD-10-CM

## 2021-04-26 DIAGNOSIS — Z13.29 SCREENING FOR THYROID DISORDER: ICD-10-CM

## 2021-04-28 ENCOUNTER — LAB (OUTPATIENT)
Dept: LAB | Facility: OTHER | Age: 66
End: 2021-04-28

## 2021-04-28 DIAGNOSIS — Z13.21 ENCOUNTER FOR SCREENING FOR NUTRITIONAL DISORDER: ICD-10-CM

## 2021-04-28 DIAGNOSIS — Z13.29 SCREENING FOR THYROID DISORDER: ICD-10-CM

## 2021-04-28 DIAGNOSIS — Z13.1 SCREENING FOR DIABETES MELLITUS: ICD-10-CM

## 2021-04-28 DIAGNOSIS — I10 ESSENTIAL HYPERTENSION: ICD-10-CM

## 2021-04-28 DIAGNOSIS — E78.2 MIXED HYPERLIPIDEMIA: ICD-10-CM

## 2021-04-28 DIAGNOSIS — R73.02 IGT (IMPAIRED GLUCOSE TOLERANCE): ICD-10-CM

## 2021-04-28 DIAGNOSIS — Z78.0 POSTMENOPAUSAL: ICD-10-CM

## 2021-04-28 LAB
ALBUMIN SERPL-MCNC: 4.1 G/DL (ref 3.5–5)
ALBUMIN/GLOB SERPL: 1.6 G/DL (ref 1.1–1.8)
ALP SERPL-CCNC: 52 U/L (ref 38–126)
ALT SERPL W P-5'-P-CCNC: 15 U/L
ANION GAP SERPL CALCULATED.3IONS-SCNC: 12 MMOL/L (ref 5–15)
AST SERPL-CCNC: 25 U/L (ref 14–36)
BASOPHILS # BLD AUTO: 0.02 10*3/MM3 (ref 0–0.2)
BASOPHILS NFR BLD AUTO: 0.4 % (ref 0–1.5)
BILIRUB SERPL-MCNC: 0.3 MG/DL (ref 0.2–1.3)
BUN SERPL-MCNC: 24 MG/DL (ref 7–23)
BUN/CREAT SERPL: 25.5 (ref 7–25)
CALCIUM SPEC-SCNC: 9.7 MG/DL (ref 8.4–10.2)
CHLORIDE SERPL-SCNC: 95 MMOL/L (ref 101–112)
CHOLEST SERPL-MCNC: 130 MG/DL (ref 150–200)
CO2 SERPL-SCNC: 31 MMOL/L (ref 22–30)
CREAT SERPL-MCNC: 0.94 MG/DL (ref 0.52–1.04)
DEPRECATED RDW RBC AUTO: 44.9 FL (ref 37–54)
EOSINOPHIL # BLD AUTO: 0.06 10*3/MM3 (ref 0–0.4)
EOSINOPHIL NFR BLD AUTO: 1.3 % (ref 0.3–6.2)
ERYTHROCYTE [DISTWIDTH] IN BLOOD BY AUTOMATED COUNT: 12.6 % (ref 12.3–15.4)
GFR SERPL CREATININE-BSD FRML MDRD: 59 ML/MIN/1.73 (ref 45–104)
GLOBULIN UR ELPH-MCNC: 2.5 GM/DL (ref 2.3–3.5)
GLUCOSE SERPL-MCNC: 121 MG/DL (ref 70–99)
HCT VFR BLD AUTO: 34.2 % (ref 34–46.6)
HDLC SERPL-MCNC: 40 MG/DL (ref 40–59)
HGB BLD-MCNC: 10.5 G/DL (ref 12–15.9)
LDLC SERPL CALC-MCNC: 64 MG/DL
LDLC/HDLC SERPL: 1.49 {RATIO} (ref 0–3.22)
LYMPHOCYTES # BLD AUTO: 1.27 10*3/MM3 (ref 0.7–3.1)
LYMPHOCYTES NFR BLD AUTO: 28.2 % (ref 19.6–45.3)
MCH RBC QN AUTO: 31.1 PG (ref 26.6–33)
MCHC RBC AUTO-ENTMCNC: 30.7 G/DL (ref 31.5–35.7)
MCV RBC AUTO: 101.2 FL (ref 79–97)
MONOCYTES # BLD AUTO: 0.42 10*3/MM3 (ref 0.1–0.9)
MONOCYTES NFR BLD AUTO: 9.3 % (ref 5–12)
NEUTROPHILS NFR BLD AUTO: 2.73 10*3/MM3 (ref 1.7–7)
NEUTROPHILS NFR BLD AUTO: 60.8 % (ref 42.7–76)
PLATELET # BLD AUTO: 233 10*3/MM3 (ref 140–450)
PMV BLD AUTO: 9.1 FL (ref 6–12)
POTASSIUM SERPL-SCNC: 3.4 MMOL/L (ref 3.4–5)
PROT SERPL-MCNC: 6.6 G/DL (ref 6.3–8.6)
RBC # BLD AUTO: 3.38 10*6/MM3 (ref 3.77–5.28)
SODIUM SERPL-SCNC: 138 MMOL/L (ref 137–145)
TRIGL SERPL-MCNC: 152 MG/DL
VLDLC SERPL-MCNC: 26 MG/DL (ref 5–40)
WBC # BLD AUTO: 4.5 10*3/MM3 (ref 3.4–10.8)

## 2021-04-28 PROCEDURE — 82746 ASSAY OF FOLIC ACID SERUM: CPT | Performed by: PHYSICIAN ASSISTANT

## 2021-04-28 PROCEDURE — 83036 HEMOGLOBIN GLYCOSYLATED A1C: CPT | Performed by: PHYSICIAN ASSISTANT

## 2021-04-28 PROCEDURE — 84443 ASSAY THYROID STIM HORMONE: CPT | Performed by: PHYSICIAN ASSISTANT

## 2021-04-28 PROCEDURE — 82306 VITAMIN D 25 HYDROXY: CPT | Performed by: PHYSICIAN ASSISTANT

## 2021-04-28 PROCEDURE — 84466 ASSAY OF TRANSFERRIN: CPT | Performed by: PHYSICIAN ASSISTANT

## 2021-04-28 PROCEDURE — 36415 COLL VENOUS BLD VENIPUNCTURE: CPT | Performed by: PHYSICIAN ASSISTANT

## 2021-04-28 PROCEDURE — 82607 VITAMIN B-12: CPT | Performed by: PHYSICIAN ASSISTANT

## 2021-04-28 PROCEDURE — 84481 FREE ASSAY (FT-3): CPT | Performed by: PHYSICIAN ASSISTANT

## 2021-04-28 PROCEDURE — 83540 ASSAY OF IRON: CPT | Performed by: PHYSICIAN ASSISTANT

## 2021-04-28 PROCEDURE — 80053 COMPREHEN METABOLIC PANEL: CPT | Performed by: PHYSICIAN ASSISTANT

## 2021-04-28 PROCEDURE — 84439 ASSAY OF FREE THYROXINE: CPT | Performed by: PHYSICIAN ASSISTANT

## 2021-04-28 PROCEDURE — 85025 COMPLETE CBC W/AUTO DIFF WBC: CPT | Performed by: PHYSICIAN ASSISTANT

## 2021-04-28 PROCEDURE — 80061 LIPID PANEL: CPT | Performed by: PHYSICIAN ASSISTANT

## 2021-04-29 LAB
25(OH)D3 SERPL-MCNC: 30.3 NG/ML (ref 30–100)
FOLATE SERPL-MCNC: >20 NG/ML (ref 4.78–24.2)
HBA1C MFR BLD: 5.55 % (ref 4.8–5.6)
IRON 24H UR-MRATE: 56 MCG/DL (ref 37–145)
IRON SATN MFR SERPL: 13 % (ref 20–50)
T3FREE SERPL-MCNC: 2.88 PG/ML (ref 2–4.4)
T4 FREE SERPL-MCNC: 1.08 NG/DL (ref 0.93–1.7)
TIBC SERPL-MCNC: 428 MCG/DL (ref 298–536)
TRANSFERRIN SERPL-MCNC: 287 MG/DL (ref 200–360)
TSH SERPL DL<=0.05 MIU/L-ACNC: 1.59 UIU/ML (ref 0.27–4.2)
VIT B12 BLD-MCNC: >2000 PG/ML (ref 211–946)

## 2021-05-04 ENCOUNTER — OFFICE VISIT (OUTPATIENT)
Dept: FAMILY MEDICINE CLINIC | Facility: CLINIC | Age: 66
End: 2021-05-04

## 2021-05-04 VITALS — HEIGHT: 65 IN | WEIGHT: 165 LBS | BODY MASS INDEX: 27.49 KG/M2

## 2021-05-04 DIAGNOSIS — D50.9 IRON DEFICIENCY ANEMIA, UNSPECIFIED IRON DEFICIENCY ANEMIA TYPE: ICD-10-CM

## 2021-05-04 DIAGNOSIS — M25.561 CHRONIC PAIN OF BOTH KNEES: ICD-10-CM

## 2021-05-04 DIAGNOSIS — M17.11 OSTEOARTHRITIS OF RIGHT KNEE, UNSPECIFIED OSTEOARTHRITIS TYPE: ICD-10-CM

## 2021-05-04 DIAGNOSIS — Z12.31 ENCOUNTER FOR SCREENING MAMMOGRAM FOR BREAST CANCER: ICD-10-CM

## 2021-05-04 DIAGNOSIS — N95.1 MENOPAUSAL AND FEMALE CLIMACTERIC STATES: ICD-10-CM

## 2021-05-04 DIAGNOSIS — G89.29 CHRONIC PAIN OF BOTH KNEES: ICD-10-CM

## 2021-05-04 DIAGNOSIS — Z80.3 FAMILY HISTORY OF BREAST CANCER: ICD-10-CM

## 2021-05-04 DIAGNOSIS — I10 ESSENTIAL HYPERTENSION: Primary | ICD-10-CM

## 2021-05-04 DIAGNOSIS — M25.562 CHRONIC PAIN OF BOTH KNEES: ICD-10-CM

## 2021-05-04 DIAGNOSIS — Z12.11 COLON CANCER SCREENING: ICD-10-CM

## 2021-05-04 DIAGNOSIS — E78.2 MIXED HYPERLIPIDEMIA: ICD-10-CM

## 2021-05-04 DIAGNOSIS — Z13.820 OSTEOPOROSIS SCREENING: ICD-10-CM

## 2021-05-04 PROCEDURE — 99442 PR PHYS/QHP TELEPHONE EVALUATION 11-20 MIN: CPT | Performed by: PHYSICIAN ASSISTANT

## 2021-05-04 RX ORDER — LISINOPRIL AND HYDROCHLOROTHIAZIDE 12.5; 1 MG/1; MG/1
1 TABLET ORAL DAILY
Qty: 30 TABLET | Refills: 5 | Status: SHIPPED | OUTPATIENT
Start: 2021-05-04

## 2021-05-04 RX ORDER — SENNOSIDES 8.6 MG
650 CAPSULE ORAL EVERY 8 HOURS PRN
Qty: 90 TABLET | Refills: 5 | Status: CANCELLED | OUTPATIENT
Start: 2021-05-04

## 2021-05-04 RX ORDER — ATORVASTATIN CALCIUM 20 MG/1
20 TABLET, FILM COATED ORAL DAILY
Qty: 90 TABLET | Refills: 1 | Status: SHIPPED | OUTPATIENT
Start: 2021-05-04

## 2021-05-04 RX ORDER — MELOXICAM 7.5 MG/1
7.5 TABLET ORAL DAILY
Qty: 30 TABLET | Refills: 5 | Status: SHIPPED | OUTPATIENT
Start: 2021-05-04

## 2021-05-04 RX ORDER — LANOLIN ALCOHOL/MO/W.PET/CERES
325 CREAM (GRAM) TOPICAL
Qty: 30 TABLET | Refills: 5 | Status: SHIPPED | OUTPATIENT
Start: 2021-05-04

## 2021-05-04 RX ORDER — LANOLIN ALCOHOL/MO/W.PET/CERES
400 CREAM (GRAM) TOPICAL DAILY
Qty: 90 TABLET | Refills: 1 | Status: CANCELLED | OUTPATIENT
Start: 2021-05-04

## 2021-05-04 NOTE — PROGRESS NOTES
Subjective   Donna Arcos is a 65 y.o. female.   You have chosen to receive care through a telephone visit. Do you consent to use a telephone visit for your medical care today? Yes This visit has been rescheduled as a phone visit to comply with patient safety concerns in accordance with CDC recommendations. Total time of discussion was 15 minutes.     History of Present Illness     Pt presents today via telephone encounter for a follow up on chronic conditions.    Essential hypertension - chronic, managed with lisinopril-hctz. She reports at home blood pressure readings running approximately 120s/70s. Pt denies chest pain, dyspnea, facial flushing, lightheadedness, dizziness, syncope/presyncope.     Hyperlipidemia - chronic, managed with atorvastatin.     Iron deficiency anemia - new. Recent cbc with rbc 3.38, hgb 10.5, hct 34.2. iron profile with iron saturation 13%. She denies abdominal pain, hematochezia, melena, lightheadedness, dizziness, weakness, dyspnea, chest pain, palpitations, pallor, fatigue.     Colon cancer screening - due. She denies family hx of colon cancer. She denies having baseline colon cancer screening. She denies abdominal pain, nausea, vomiting, diarrhea, constipation, hematochezia, melena.     Screen breast cancer - due. Ordered at previous visit - reports she was unable to schedule this d/t schedule conflict. She does report family history of breast cancer in sister at age 45.     Bilateral knee OA - chronic, pain worse to right knee compared to left, followed by Dr. Crabtree, managed with mobic, corticosteroid knee injections q 3-6 months by orthopedics. Pain rated 5/10.     The following portions of the patient's history were reviewed and updated as appropriate: allergies, current medications, past family history, past medical history, past social history, past surgical history and problem list.    Review of Systems   Constitutional: Negative for activity change, appetite change, chills,  diaphoresis, fatigue, fever, unexpected weight gain and unexpected weight loss.   HENT: Negative.  Negative for congestion, dental problem, drooling, ear discharge, ear pain, facial swelling, hearing loss, mouth sores, nosebleeds, postnasal drip, rhinorrhea, sinus pressure, sneezing, sore throat, swollen glands, tinnitus, trouble swallowing and voice change.    Eyes: Negative for blurred vision, double vision, pain and visual disturbance.   Respiratory: Negative for apnea, cough, choking, chest tightness, shortness of breath, wheezing and stridor.    Cardiovascular: Negative for chest pain, palpitations and leg swelling.   Gastrointestinal: Negative for abdominal distention, abdominal pain, constipation, diarrhea, nausea, vomiting, GERD and indigestion.   Endocrine: Negative.    Musculoskeletal: Positive for arthralgias. Negative for back pain, gait problem, joint swelling, myalgias, neck pain, neck stiffness and bursitis.   Skin: Negative.    Neurological: Negative for dizziness, weakness, light-headedness, headache and confusion.   Psychiatric/Behavioral: Negative.  Negative for agitation, decreased concentration, sleep disturbance, suicidal ideas, depressed mood and stress. The patient is not nervous/anxious.        Objective   Physical Exam   Constitutional: No distress.   HENT:   Head: Normocephalic and atraumatic.   Right Ear: External ear normal.   Left Ear: External ear normal.   Nose: Nose normal.   Eyes: Conjunctivae and EOM are normal.   Pulmonary/Chest: Effort normal. No stridor.  No respiratory distress. She no audible wheeze...She exhibits no tenderness.   Abdominal: Abdomen appears normal. Soft.   Musculoskeletal: Normal range of motion.         General: No edema.   Neurological: She is alert.   Skin: Skin is warm and dry. No rash noted. She is not diaphoretic. No erythema. No pallor.   Psychiatric: She has a normal mood and affect.        PE limited d/t telehealth encounter.    WBC   Date Value Ref  Range Status   04/28/2021 4.50 3.40 - 10.80 10*3/mm3 Final     RBC   Date Value Ref Range Status   04/28/2021 3.38 (L) 3.77 - 5.28 10*6/mm3 Final     Hemoglobin   Date Value Ref Range Status   04/28/2021 10.5 (L) 12.0 - 15.9 g/dL Final     Hematocrit   Date Value Ref Range Status   04/28/2021 34.2 34.0 - 46.6 % Final     MCV   Date Value Ref Range Status   04/28/2021 101.2 (H) 79.0 - 97.0 fL Final     MCH   Date Value Ref Range Status   04/28/2021 31.1 26.6 - 33.0 pg Final     MCHC   Date Value Ref Range Status   04/28/2021 30.7 (L) 31.5 - 35.7 g/dL Final     RDW   Date Value Ref Range Status   04/28/2021 12.6 12.3 - 15.4 % Final     RDW-SD   Date Value Ref Range Status   04/28/2021 44.9 37.0 - 54.0 fl Final     MPV   Date Value Ref Range Status   04/28/2021 9.1 6.0 - 12.0 fL Final     Platelets   Date Value Ref Range Status   04/28/2021 233 140 - 450 10*3/mm3 Final     Neutrophil %   Date Value Ref Range Status   04/28/2021 60.8 42.7 - 76.0 % Final     Lymphocyte %   Date Value Ref Range Status   04/28/2021 28.2 19.6 - 45.3 % Final     Monocyte %   Date Value Ref Range Status   04/28/2021 9.3 5.0 - 12.0 % Final     Eosinophil %   Date Value Ref Range Status   04/28/2021 1.3 0.3 - 6.2 % Final     Basophil %   Date Value Ref Range Status   04/28/2021 0.4 0.0 - 1.5 % Final     Neutrophils, Absolute   Date Value Ref Range Status   04/28/2021 2.73 1.70 - 7.00 10*3/mm3 Final     Lymphocytes, Absolute   Date Value Ref Range Status   04/28/2021 1.27 0.70 - 3.10 10*3/mm3 Final     Monocytes, Absolute   Date Value Ref Range Status   04/28/2021 0.42 0.10 - 0.90 10*3/mm3 Final     Eosinophils, Absolute   Date Value Ref Range Status   04/28/2021 0.06 0.00 - 0.40 10*3/mm3 Final     Basophils, Absolute   Date Value Ref Range Status   04/28/2021 0.02 0.00 - 0.20 10*3/mm3 Final      Lab Results   Component Value Date    GLUCOSE 121 (H) 04/28/2021    BUN 24 (H) 04/28/2021    CREATININE 0.94 04/28/2021    EGFRIFNONA 59 04/28/2021     BCR 25.5 (H) 04/28/2021    K 3.4 04/28/2021    CO2 31.0 (H) 04/28/2021    CALCIUM 9.7 04/28/2021    ALBUMIN 4.10 04/28/2021    AST 25 04/28/2021    ALT 15 04/28/2021      Lipid Panel    Lipid Panel 7/7/20 4/28/21   Total Cholesterol 157 130 (A)   Triglycerides 148 152 (A)   HDL Cholesterol 54 40   VLDL Cholesterol 29.6 26   LDL Cholesterol  73 64   LDL/HDL Ratio 1.36 1.49   (A) Abnormal value               Assessment/Plan   Diagnoses and all orders for this visit:    1. Essential hypertension (Primary)  -     lisinopril-hydrochlorothiazide (PRINZIDE,ZESTORETIC) 10-12.5 MG per tablet; Take 1 tablet by mouth Daily.  Dispense: 30 tablet; Refill: 5    2. Mixed hyperlipidemia  -     atorvastatin (LIPITOR) 20 MG tablet; Take 1 tablet by mouth Daily.  Dispense: 90 tablet; Refill: 1    3. Colon cancer screening  -     Ambulatory Referral to General Surgery    4. Iron deficiency anemia, unspecified iron deficiency anemia type  -     Ambulatory Referral to General Surgery  -     CBC w AUTO Differential; Future  -     ferrous sulfate 325 (65 FE) MG EC tablet; Take 1 tablet by mouth Daily With Breakfast.  Dispense: 30 tablet; Refill: 5    5. Encounter for screening mammogram for breast cancer  -     Mammo screening digital tomosynthesis bilateral w CAD; Future    6. Family history of breast cancer  -     Mammo screening digital tomosynthesis bilateral w CAD; Future    7. Osteoporosis screening  -     DEXA Bone Density Axial    8. Osteoarthritis of right knee, unspecified osteoarthritis type  -     meloxicam (MOBIC) 7.5 MG tablet; Take 1 tablet by mouth Daily.  Dispense: 30 tablet; Refill: 5    9. Chronic pain of both knees  -     XR Knee 1 or 2 View Bilateral; Future    10. Menopausal and female climacteric states   -     DEXA Bone Density Axial    Other orders  -     Cancel: folic acid (FOLVITE) 400 MCG tablet; Take 1 tablet by mouth Daily.  Dispense: 90 tablet; Refill: 1  -     Cancel: acetaminophen (TYLENOL) 650 MG 8 hr  tablet; Take 1 tablet by mouth Every 8 (Eight) Hours As Needed for Mild Pain .  Dispense: 90 tablet; Refill: 5      Essential htn - chronic, controlled at home blood pressure readings, managed with prinzide. We discussed dietary changes and the need to decrease salt intake, consistent with the dash diet. Also encouraged the patient to begin regular exercise routine.     Hyperlipidemia - chronic, managed with atorvastatin. Advised healthy lifestyle changes of low fat diet & exercise daily as tolerated.    IVON - new. Begin ferrous sulfate 325mg daily; discussed potential adverse effects/side effects of medication. Increase iron containing foods - leafy greens, animal products, iron fortified grains. Will also refer to general surgery as she is due colon cancer screening and new IVON. Recheck cbc in 1 week. Recheck iron profile in 3 months.    Colon cancer screening - due. She is agreeable to referral for colon cancer screening. Order placed as above. We discussed risks of declining screening including occult malignancy and metastatic disease. Pt verbalized understanding.     Breast cancer screening - due. Family hx of breast cancer in first degree relative at age 45. She is agreeable to mammogram, ordered as above. We discussed risks of declining screening including occult malignancy and metastatic disease. Pt verbalized understanding.     Bilateral knee OA - chronic, followed by Dr. Crabtree, managed with mobic, corticosteroid knee injections q 3-6 months by orthopedics. Obtain xr bilateral knees, will notify pt of results when received & address appropriately. Advised RICE therapy. Alternate tylenol otc as directed PRN with nsaid.    Dexa - due. Ordered as above.    Patient educated to follow up in 3 months or sooner than next scheduled appointment if symptoms worsen or do not improve. Patient stated understanding and has agreed with plan of care. After visit summary was printed and given to patient.      This  document has been electronically signed by Alissa English PA-C on May 4, 2021 10:27 CDT,.

## 2021-05-07 ENCOUNTER — LAB (OUTPATIENT)
Dept: LAB | Facility: OTHER | Age: 66
End: 2021-05-07

## 2021-05-07 DIAGNOSIS — D50.9 IRON DEFICIENCY ANEMIA, UNSPECIFIED IRON DEFICIENCY ANEMIA TYPE: ICD-10-CM

## 2021-05-07 LAB
BASOPHILS # BLD AUTO: 0.01 10*3/MM3 (ref 0–0.2)
BASOPHILS NFR BLD AUTO: 0.2 % (ref 0–1.5)
DEPRECATED RDW RBC AUTO: 46.9 FL (ref 37–54)
EOSINOPHIL # BLD AUTO: 0.07 10*3/MM3 (ref 0–0.4)
EOSINOPHIL NFR BLD AUTO: 1.4 % (ref 0.3–6.2)
ERYTHROCYTE [DISTWIDTH] IN BLOOD BY AUTOMATED COUNT: 13 % (ref 12.3–15.4)
HCT VFR BLD AUTO: 35.4 % (ref 34–46.6)
HGB BLD-MCNC: 11 G/DL (ref 12–15.9)
LYMPHOCYTES # BLD AUTO: 1.23 10*3/MM3 (ref 0.7–3.1)
LYMPHOCYTES NFR BLD AUTO: 25 % (ref 19.6–45.3)
MCH RBC QN AUTO: 31.7 PG (ref 26.6–33)
MCHC RBC AUTO-ENTMCNC: 31.1 G/DL (ref 31.5–35.7)
MCV RBC AUTO: 102 FL (ref 79–97)
MONOCYTES # BLD AUTO: 0.44 10*3/MM3 (ref 0.1–0.9)
MONOCYTES NFR BLD AUTO: 8.9 % (ref 5–12)
NEUTROPHILS NFR BLD AUTO: 3.17 10*3/MM3 (ref 1.7–7)
NEUTROPHILS NFR BLD AUTO: 64.5 % (ref 42.7–76)
PLATELET # BLD AUTO: 245 10*3/MM3 (ref 140–450)
PMV BLD AUTO: 9.1 FL (ref 6–12)
RBC # BLD AUTO: 3.47 10*6/MM3 (ref 3.77–5.28)
WBC # BLD AUTO: 4.92 10*3/MM3 (ref 3.4–10.8)

## 2021-05-07 PROCEDURE — 85025 COMPLETE CBC W/AUTO DIFF WBC: CPT | Performed by: PHYSICIAN ASSISTANT

## 2021-05-07 PROCEDURE — 36415 COLL VENOUS BLD VENIPUNCTURE: CPT | Performed by: PHYSICIAN ASSISTANT

## 2021-05-13 DIAGNOSIS — D64.9 MILD ANEMIA: Primary | ICD-10-CM
